# Patient Record
Sex: FEMALE | Race: WHITE | Employment: FULL TIME | URBAN - METROPOLITAN AREA
[De-identification: names, ages, dates, MRNs, and addresses within clinical notes are randomized per-mention and may not be internally consistent; named-entity substitution may affect disease eponyms.]

---

## 2017-01-27 ENCOUNTER — HOSPITAL ENCOUNTER (OUTPATIENT)
Dept: MAMMOGRAPHY | Facility: CLINIC | Age: 57
Discharge: HOME | End: 2017-01-27
Attending: OBSTETRICS & GYNECOLOGY | Admitting: OBSTETRICS & GYNECOLOGY
Payer: COMMERCIAL

## 2017-01-27 DIAGNOSIS — Z12.31 VISIT FOR SCREENING MAMMOGRAM: ICD-10-CM

## 2017-01-27 PROCEDURE — 77063 BREAST TOMOSYNTHESIS BI: CPT

## 2017-06-24 ENCOUNTER — HEALTH MAINTENANCE LETTER (OUTPATIENT)
Age: 57
End: 2017-06-24

## 2018-03-31 ENCOUNTER — HEALTH MAINTENANCE LETTER (OUTPATIENT)
Age: 58
End: 2018-03-31

## 2018-07-05 ENCOUNTER — HOSPITAL ENCOUNTER (OUTPATIENT)
Dept: MAMMOGRAPHY | Facility: CLINIC | Age: 58
Discharge: HOME | End: 2018-07-05
Attending: OBSTETRICS & GYNECOLOGY | Admitting: OBSTETRICS & GYNECOLOGY
Payer: COMMERCIAL

## 2018-07-05 DIAGNOSIS — Z12.31 VISIT FOR SCREENING MAMMOGRAM: ICD-10-CM

## 2018-07-05 PROCEDURE — 77067 SCR MAMMO BI INCL CAD: CPT

## 2019-10-02 ENCOUNTER — HEALTH MAINTENANCE LETTER (OUTPATIENT)
Age: 59
End: 2019-10-02

## 2020-01-23 ENCOUNTER — HOSPITAL ENCOUNTER (OUTPATIENT)
Dept: MAMMOGRAPHY | Facility: CLINIC | Age: 60
Discharge: HOME | End: 2020-01-23
Attending: OBSTETRICS & GYNECOLOGY | Admitting: OBSTETRICS & GYNECOLOGY
Payer: COMMERCIAL

## 2020-01-23 DIAGNOSIS — Z12.31 SCREENING MAMMOGRAM, ENCOUNTER FOR: ICD-10-CM

## 2020-01-23 PROCEDURE — 77063 BREAST TOMOSYNTHESIS BI: CPT

## 2020-01-29 ENCOUNTER — HOSPITAL ENCOUNTER (OUTPATIENT)
Dept: MAMMOGRAPHY | Facility: CLINIC | Age: 60
End: 2020-01-29
Attending: OBSTETRICS & GYNECOLOGY
Payer: COMMERCIAL

## 2020-01-29 ENCOUNTER — HOSPITAL ENCOUNTER (OUTPATIENT)
Dept: MAMMOGRAPHY | Facility: CLINIC | Age: 60
Discharge: HOME | End: 2020-01-29
Attending: OBSTETRICS & GYNECOLOGY | Admitting: OBSTETRICS & GYNECOLOGY
Payer: COMMERCIAL

## 2020-01-29 DIAGNOSIS — R92.8 ABNORMAL MAMMOGRAM: ICD-10-CM

## 2020-01-29 PROCEDURE — 00000158 ZZHCL STATISTIC H-FISH PROCESS B/S: Performed by: OBSTETRICS & GYNECOLOGY

## 2020-01-29 PROCEDURE — 00000159 ZZHCL STATISTIC H-SEND OUTS PREP: Performed by: OBSTETRICS & GYNECOLOGY

## 2020-01-29 PROCEDURE — 88305 TISSUE EXAM BY PATHOLOGIST: CPT | Performed by: OBSTETRICS & GYNECOLOGY

## 2020-01-29 PROCEDURE — 88305 TISSUE EXAM BY PATHOLOGIST: CPT | Mod: 26 | Performed by: OBSTETRICS & GYNECOLOGY

## 2020-01-29 PROCEDURE — 25000125 ZZHC RX 250: Performed by: OBSTETRICS & GYNECOLOGY

## 2020-01-29 PROCEDURE — 88360 TUMOR IMMUNOHISTOCHEM/MANUAL: CPT | Performed by: OBSTETRICS & GYNECOLOGY

## 2020-01-29 PROCEDURE — 76642 ULTRASOUND BREAST LIMITED: CPT | Mod: LT

## 2020-01-29 PROCEDURE — 27210206 US BREAST BIOPSY CORE NEEDLE LEFT

## 2020-01-29 PROCEDURE — 88360 TUMOR IMMUNOHISTOCHEM/MANUAL: CPT | Mod: 26 | Performed by: OBSTETRICS & GYNECOLOGY

## 2020-01-29 PROCEDURE — 88377 M/PHMTRC ALYS ISHQUANT/SEMIQ: CPT | Performed by: PATHOLOGY

## 2020-01-29 RX ORDER — NORETHINDRONE ACETATE AND ETHINYL ESTRADIOL .5; 2.5 MG/1; UG/1
TABLET ORAL DAILY
COMMUNITY
End: 2020-02-05

## 2020-01-29 RX ADMIN — LIDOCAINE HYDROCHLORIDE 5 ML: 10 INJECTION, SOLUTION INFILTRATION; PERINEURAL at 10:20

## 2020-01-29 NOTE — DISCHARGE INSTRUCTIONS

## 2020-01-30 ENCOUNTER — TELEPHONE (OUTPATIENT)
Dept: SURGERY | Facility: CLINIC | Age: 60
End: 2020-01-30

## 2020-01-31 ENCOUNTER — TELEPHONE (OUTPATIENT)
Dept: SURGERY | Facility: CLINIC | Age: 60
End: 2020-01-31

## 2020-01-31 LAB — COPATH REPORT: NORMAL

## 2020-01-31 NOTE — TELEPHONE ENCOUNTER
Deb notified of the following:    RESULTS:     ESTROGEN RECEPTORS:     Positive (95 % of tumor cell nuclei stain positive)   Intensity of staining strong     PROGESTERONE RECEPTORS:     Positive (30 % of tumor cell nuclei stain positive)   Intensity of staining strong     Await HER2 results. Keep surgical consultation as scheduled. Deb verbalized understanding.    Tawanna Hutchinson BSN, RN, OCN  Oncology Care Coordinator  [facility]  [facility]  Phone: [phone]

## 2020-02-03 LAB — COPATH REPORT: NORMAL

## 2020-02-04 ENCOUNTER — TELEPHONE (OUTPATIENT)
Dept: SURGERY | Facility: CLINIC | Age: 60
End: 2020-02-04

## 2020-02-04 NOTE — TELEPHONE ENCOUNTER
Call placed to patient with results of HER2:    INTERPRETATION:  Group 5 (JOCELYNN Negative)     Keep surgical consultation as scheduled. Both parties in agreement of plan.    Tawanna DAVISN, RN, OCN  Oncology Care Coordinator  [facility]  [facility]  Phone: [phone]

## 2020-02-05 ENCOUNTER — OFFICE VISIT (OUTPATIENT)
Dept: SURGERY | Facility: CLINIC | Age: 60
End: 2020-02-05
Payer: COMMERCIAL

## 2020-02-05 VITALS
HEIGHT: 69 IN | HEART RATE: 70 BPM | DIASTOLIC BLOOD PRESSURE: 88 MMHG | WEIGHT: 148 LBS | SYSTOLIC BLOOD PRESSURE: 136 MMHG | BODY MASS INDEX: 21.92 KG/M2

## 2020-02-05 DIAGNOSIS — C50.912 INVASIVE DUCTAL CARCINOMA OF BREAST, FEMALE, LEFT (H): ICD-10-CM

## 2020-02-05 PROCEDURE — 99205 OFFICE O/P NEW HI 60 MIN: CPT | Performed by: SURGERY

## 2020-02-05 ASSESSMENT — MIFFLIN-ST. JEOR: SCORE: 1320.7

## 2020-02-05 NOTE — NURSING NOTE
Breast Patients    BREAST PATIENTS (ALL)    1-Do you have any of the following symptoms? Breast Pain and Lump(s) or Mass(es)  2-In which breast are you having the symptoms? left  3-Have you had a Mammogram? Yes  Where: [facility]  Date: 1/23/20   4-Have you ever had a breast cyst drained? No  5-Have you ever had a breast biopsy? No  6-Have you ever had a Breast Cancer? No   7-Is there a history of Breast Cancer in your family? No  8-Have you ever had Ovarian Cancer? No  9-Is there a history of Ovarian Cancer in your family? No  10-Summarize your caffeine intake (i.e. coffee, tea, chocolate, soda etc.): coffee in the morning    BREAST PATIENTS (FEMALE)    11-What age did your periods begin? 15  12-Date your last menstrual period began? 4 years ago  13-Number of full-term pregnancies: 1  14-Your age when your first child was born? 38  15-Did you nurse your children? No  16-Are you pregnant now? No  17-Have you begun menopause? Yes  Age Menopause began:  [age]  18-Have you had either ovary removed?No  19-Do you have breast implants? No   20-Do you use hormone replacement therapy?  Yes  Type: fyavolv  21-Have you taken oral contraceptive pills?  Yes, For how many years?  30  22-Have you had an intrauterine device (IUD) placed?  No  23-What is your current bra size?  34B    Susan Obregon CMA  2/5/2020      9:55 AM'

## 2020-02-05 NOTE — LETTER
February 6, 2020          Fab L Middle Bass, MD  [facility]  [address] [facility] [address]  [place] [address]      RE:   Deb Shaw 1960      Dear Colleague,    Thank you for referring your patient, Deb Shaw, to [facility]. Please see a copy of my visit note below.    Healthy 59-year-old female with a new diagnosis of invasive ductal carcinoma on the left.  Although this is not a particularly large lesion at 2.5 cm, given the patient's very small breast size, I feel a lumpectomy would leave a poor cosmetic result.  I did explain the equivalent survival rates between lumpectomy with radiation and mastectomy.  We talked about additional options including chemotherapy and anti-hormone therapy.  She is a good candidate for antiestrogen therapy, the decision regarding chemo will likely be made after surgery.  I think she should be a candidate for nipple sparing mastectomy on the left.  She had questions about reconstruction and size, and I did say that something might need to be done on the right for symmetry.  We are having her meet with plastic surgery to discuss reconstruction options.  Mastectomy without reconstruction was also a consideration, but she did not have much interest in this.  After she meets with plastic surgery we will discuss her final thoughts and likely get her scheduled.  I have advised her to discontinue her hormone replacement, although she did have concerns about this.  Given her relatively young age, she may consider genetic counseling, but this could be done at a later time.  We will notify her once her plastic surgery consult is complete.  Surgical co-morbities include asthma, bursitis, herniated disc.    Again, thank you for allowing me to participate in the care of your patient.      Sincerely,      Bob Gonzalez MD

## 2020-02-06 PROBLEM — C50.912 INVASIVE DUCTAL CARCINOMA OF BREAST, FEMALE, LEFT (H): Status: ACTIVE | Noted: 2020-02-06

## 2020-02-06 NOTE — PROGRESS NOTES
Surgery Consultation, [facility] HOLA Gonzalez MD, MD    Deb Shaw MRN# [id number]   YOB: 1960 Age: 59 year old     PCP:  Fab Owens [phone]    Chief Complaint: Newly diagnosed left breast cancer    Pt was seen in consultation from Fab Owens.    History of Present Illness:  Deb Shaw is a 59 year old female who presented with a palpable mass in the left central breast.  This was intermittently tender.  Not associated with any nipple discharge or skin changes.  Patient is thin and has very small breasts bilaterally.  No history of previous breast biopsy but has been recalled from screening mammogram.  No history of previous breast infection.  No family history of breast cancer.  She has 1 child which she did not breast-feed.  She is postmenopausal.  The finding in the left breast was further assessed with diagnostic mammogram and ultrasound and biopsy was performed.  This revealed invasive ductal carcinoma.  This was strongly ER positive, 30% CO positive.  HER-2/bulmaro was negative.  Patient has a long history of taking oral birth control when she was younger.  Has been taking hormone supplementation for the past 2 years due to postmenopausal symptoms.  Patient is here to discuss her diagnosis.    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  PSH:  Deb Shaw  has a past surgical history that includes REPAIR OF HAMMERTOE,ONE (12/03) and TOOTH EXTRACTION W/FORCEP (1982).    Home medications and allergies reviewed.    Social History:  Deb Shaw  reports that she has never smoked. She has never used smokeless tobacco. She reports current alcohol use. She reports that she does not use  "drugs.  Family History:  Deb Shaw family history includes Allergies in her brother; C.A.D. in her paternal grandfather; Cancer in her father; Coronary Artery Disease in her father; Hypertension in her father; Thyroid Disease in her mother.    ROS:  The 10 point Review of Systems is negative other than noted in the HPI.  No fevers or chills.  No recent weight loss or weight gain.  No headaches or back pain.    Physical Exam:  Blood pressure 136/88, pulse 70, height 1.753 m (5' 9\"), weight 67.1 kg (148 lb), not currently breastfeeding.  148 lbs 0 oz  Thin healthy appearing female in no distress.  Patient has a pleasant affect and communicates well.   Pupils equal round and reactive to light.   No cervical lymphadenopathy or thyromegaly.   Lung fields clear, breathing comfortably.   Heart normal sinus rhythm.  No murmurs rubs or gallops.  Bilateral breast exam performed.  Very small breasts bilaterally.  Dense, somewhat lumpy bumpy.  No axillary lymphadenopathy on either side.  Easily palpable 2 to 3 cm mass in the left central breast behind the areola.  No skin thickening or nipple inversion.  Skin warm, dry.  No obvious rashes or lesions.    All new lab and imaging data was reviewed.  This includes personal review of mammogram and ultrasound images, pathology reports, clinic notes.     Assessment/plan: Healthy 59-year-old female with a new diagnosis of invasive ductal carcinoma on the left.  Although this is not a particularly large lesion at 2.5 cm, given the patient's very small breast size, I feel a lumpectomy would leave a poor cosmetic result.  I did explain the equivalent survival rates between lumpectomy with radiation and mastectomy.  We talked about additional options including chemotherapy and anti-hormone therapy.  She is a good candidate for antiestrogen therapy, the decision regarding chemo will likely be made after surgery.  I think she should be a candidate for nipple sparing mastectomy on the " left.  She had questions about reconstruction and size, and I did say that something might need to be done on the right for symmetry.  We are having her meet with plastic surgery to discuss reconstruction options.  Mastectomy without reconstruction was also a consideration, but she did not have much interest in this.  After she meets with plastic surgery we will discuss her final thoughts and likely get her scheduled.  I have advised her to discontinue her hormone replacement, although she did have concerns about this.  Given her relatively young age, she may consider genetic counseling, but this could be done at a later time.  We will notify her once her plastic surgery consult is complete.  Surgical co-morbities include asthma, bursitis, herniated disc.    Maximo Gonzalez M.D.  [facility]  [phone]    Total time spent 60 minutes, 50 minutes of which was spent discussing diagnosis, treatment options, and prognosis.    Please route or send letter to:  Primary Care Provider (PCP) and Referring Provider

## 2020-02-11 ENCOUNTER — TRANSFERRED RECORDS (OUTPATIENT)
Dept: HEALTH INFORMATION MANAGEMENT | Facility: CLINIC | Age: 60
End: 2020-02-11

## 2020-02-12 ENCOUNTER — MEDICAL CORRESPONDENCE (OUTPATIENT)
Dept: HEALTH INFORMATION MANAGEMENT | Facility: CLINIC | Age: 60
End: 2020-02-12

## 2020-02-12 ENCOUNTER — PREP FOR PROCEDURE (OUTPATIENT)
Dept: SURGERY | Facility: CLINIC | Age: 60
End: 2020-02-12

## 2020-02-12 ENCOUNTER — CARE COORDINATION (OUTPATIENT)
Dept: SURGERY | Facility: CLINIC | Age: 60
End: 2020-02-12

## 2020-02-12 ENCOUNTER — TRANSFERRED RECORDS (OUTPATIENT)
Dept: HEALTH INFORMATION MANAGEMENT | Facility: CLINIC | Age: 60
End: 2020-02-12

## 2020-02-12 DIAGNOSIS — C50.912 MALIGNANT NEOPLASM OF LEFT BREAST (H): Primary | ICD-10-CM

## 2020-02-12 NOTE — PROGRESS NOTES
Follow up call placed to Deb. Deb met with Dr. Lopez, plastic surgeon. She is interested in proceeding with bilateral nipple sparing mastectomy with reconstruction. Await call from surgery scheduler.    Rx faxed to [facility] for post mastectomy garment.     Taawnna DAVISN, RN, OCN  Oncology Care Coordinator  [facility]  [facility]  Phone: [phone]

## 2020-02-13 ENCOUNTER — TELEPHONE (OUTPATIENT)
Dept: SURGERY | Facility: CLINIC | Age: 60
End: 2020-02-13

## 2020-02-13 DIAGNOSIS — C50.912 MALIGNANT NEOPLASM OF LEFT BREAST (H): Primary | ICD-10-CM

## 2020-02-13 NOTE — TELEPHONE ENCOUNTER
Type of surgery: Bilateral nipple sparing mastectomy with left sentinel lymph sanjay biopsy, possible left axillary node dissection  Location of surgery: [facility]  Date and time of surgery: 3/5/20 at 10:30am  Surgeon: Dr. Bob Gonzalez  Pre-Op Appt Date: Patient to schedule  Post-Op Appt Date: Patient to schedule   Packet sent out: Yes  Pre-cert/Authorization completed:  Not Applicable  Date: 2/13/20

## 2020-02-28 ENCOUNTER — TELEPHONE (OUTPATIENT)
Dept: SURGERY | Facility: CLINIC | Age: 60
End: 2020-02-28

## 2020-02-28 NOTE — TELEPHONE ENCOUNTER
Deb is scheduled for bilateral nipple sparing mastectomy, left SLNB on 3/5/2020 with Dr. Gonzalez. Reconstruction per Dr. Lopez. Pre procedure call placed to patient. All patient's questions answered appropriately and thoroughly. Deb knows to contact us in the interim with questions.     Tawanna DAVISN, RN, OCN  Oncology Care Coordinator  [facility]  [facility]  Phone: [phone]

## 2020-03-04 RX ORDER — CETIRIZINE HYDROCHLORIDE 10 MG/1
10 TABLET ORAL DAILY
COMMUNITY

## 2020-03-04 RX ORDER — ASCORBIC ACID 500 MG
500 TABLET ORAL EVERY MORNING
COMMUNITY

## 2020-03-04 RX ORDER — NORETHINDRONE ACETATE AND ETHINYL ESTRADIOL 1; 5 MG/1; UG/1
1 TABLET ORAL DAILY
Status: ON HOLD | COMMUNITY
End: 2020-03-05

## 2020-03-04 RX ORDER — TOBRAMYCIN 3 MG/ML
1-2 SOLUTION/ DROPS OPHTHALMIC
Status: ON HOLD | COMMUNITY
End: 2020-03-05

## 2020-03-05 ENCOUNTER — APPOINTMENT (OUTPATIENT)
Dept: SURGERY | Facility: PHYSICIAN GROUP | Age: 60
End: 2020-03-05
Payer: COMMERCIAL

## 2020-03-05 ENCOUNTER — TRANSFERRED RECORDS (OUTPATIENT)
Dept: HEALTH INFORMATION MANAGEMENT | Facility: CLINIC | Age: 60
End: 2020-03-05

## 2020-03-05 ENCOUNTER — HOSPITAL ENCOUNTER (OUTPATIENT)
Facility: CLINIC | Age: 60
Discharge: HOME | End: 2020-03-06
Attending: SURGERY | Admitting: SURGERY
Payer: COMMERCIAL

## 2020-03-05 ENCOUNTER — SURGERY (OUTPATIENT)
Age: 60
End: 2020-03-05
Payer: COMMERCIAL

## 2020-03-05 ENCOUNTER — ANESTHESIA EVENT (OUTPATIENT)
Dept: SURGERY | Facility: CLINIC | Age: 60
End: 2020-03-05
Payer: COMMERCIAL

## 2020-03-05 ENCOUNTER — ANESTHESIA (OUTPATIENT)
Dept: SURGERY | Facility: CLINIC | Age: 60
End: 2020-03-05
Payer: COMMERCIAL

## 2020-03-05 ENCOUNTER — HOSPITAL ENCOUNTER (OUTPATIENT)
Dept: NUCLEAR MEDICINE | Facility: CLINIC | Age: 60
Setting detail: NUCLEAR MEDICINE
Discharge: HOME | End: 2020-03-05
Attending: SURGERY | Admitting: SURGERY
Payer: COMMERCIAL

## 2020-03-05 DIAGNOSIS — G89.18 ACUTE POST-OPERATIVE PAIN: ICD-10-CM

## 2020-03-05 DIAGNOSIS — C50.912 MALIGNANT NEOPLASM OF LEFT BREAST (H): ICD-10-CM

## 2020-03-05 DIAGNOSIS — C50.912 INVASIVE DUCTAL CARCINOMA OF BREAST, FEMALE, LEFT (H): Primary | ICD-10-CM

## 2020-03-05 PROBLEM — C50.919 BREAST CANCER (H): Status: ACTIVE | Noted: 2020-03-05

## 2020-03-05 LAB
CREAT SERPL-MCNC: 0.8 MG/DL (ref 0.52–1.04)
GFR SERPL CREATININE-BSD FRML MDRD: 82 ML/MIN/{1.73_M2}

## 2020-03-05 PROCEDURE — 40000170 ZZH STATISTIC PRE-PROCEDURE ASSESSMENT II: Performed by: SURGERY

## 2020-03-05 PROCEDURE — 25000125 ZZHC RX 250: Performed by: SURGERY

## 2020-03-05 PROCEDURE — 25000128 H RX IP 250 OP 636: Performed by: PLASTIC SURGERY

## 2020-03-05 PROCEDURE — 82565 ASSAY OF CREATININE: CPT | Performed by: SURGERY

## 2020-03-05 PROCEDURE — 25800030 ZZH RX IP 258 OP 636: Performed by: ANESTHESIOLOGY

## 2020-03-05 PROCEDURE — A9520 TC99 TILMANOCEPT DIAG 0.5MCI: HCPCS | Performed by: SURGERY

## 2020-03-05 PROCEDURE — 37000009 ZZH ANESTHESIA TECHNICAL FEE, EACH ADDTL 15 MIN: Performed by: SURGERY

## 2020-03-05 PROCEDURE — 36000056 ZZH SURGERY LEVEL 3 1ST 30 MIN: Performed by: SURGERY

## 2020-03-05 PROCEDURE — 34300033 ZZH RX 343: Performed by: SURGERY

## 2020-03-05 PROCEDURE — 25000125 ZZHC RX 250: Performed by: ANESTHESIOLOGY

## 2020-03-05 PROCEDURE — 88305 TISSUE EXAM BY PATHOLOGIST: CPT | Performed by: SURGERY

## 2020-03-05 PROCEDURE — 71000013 ZZH RECOVERY PHASE 1 LEVEL 1 EA ADDTL HR: Performed by: SURGERY

## 2020-03-05 PROCEDURE — 19303 MAST SIMPLE COMPLETE: CPT | Mod: 50 | Performed by: PHYSICIAN ASSISTANT

## 2020-03-05 PROCEDURE — 88305 TISSUE EXAM BY PATHOLOGIST: CPT | Mod: 26 | Performed by: SURGERY

## 2020-03-05 PROCEDURE — 38525 BIOPSY/REMOVAL LYMPH NODES: CPT | Mod: LT | Performed by: SURGERY

## 2020-03-05 PROCEDURE — 25800025 ZZH RX 258: Performed by: PLASTIC SURGERY

## 2020-03-05 PROCEDURE — 25000566 ZZH SEVOFLURANE, EA 15 MIN: Performed by: SURGERY

## 2020-03-05 PROCEDURE — 88331 PATH CONSLTJ SURG 1 BLK 1SPC: CPT | Mod: XS | Performed by: SURGERY

## 2020-03-05 PROCEDURE — 25800030 ZZH RX IP 258 OP 636: Performed by: PLASTIC SURGERY

## 2020-03-05 PROCEDURE — 25000128 H RX IP 250 OP 636: Performed by: NURSE ANESTHETIST, CERTIFIED REGISTERED

## 2020-03-05 PROCEDURE — 19303 MAST SIMPLE COMPLETE: CPT | Mod: 50 | Performed by: SURGERY

## 2020-03-05 PROCEDURE — 25000128 H RX IP 250 OP 636: Performed by: SURGERY

## 2020-03-05 PROCEDURE — 88307 TISSUE EXAM BY PATHOLOGIST: CPT | Performed by: SURGERY

## 2020-03-05 PROCEDURE — 36000058 ZZH SURGERY LEVEL 3 EA 15 ADDTL MIN: Performed by: SURGERY

## 2020-03-05 PROCEDURE — 25000132 ZZH RX MED GY IP 250 OP 250 PS 637: Performed by: PLASTIC SURGERY

## 2020-03-05 PROCEDURE — 88307 TISSUE EXAM BY PATHOLOGIST: CPT | Mod: 26 | Performed by: SURGERY

## 2020-03-05 PROCEDURE — 36415 COLL VENOUS BLD VENIPUNCTURE: CPT | Performed by: SURGERY

## 2020-03-05 PROCEDURE — 37000008 ZZH ANESTHESIA TECHNICAL FEE, 1ST 30 MIN: Performed by: SURGERY

## 2020-03-05 PROCEDURE — 25000125 ZZHC RX 250: Performed by: NURSE ANESTHETIST, CERTIFIED REGISTERED

## 2020-03-05 PROCEDURE — 27810323 ZZHC OR TISSUE EXPANDER OPNP: Performed by: SURGERY

## 2020-03-05 PROCEDURE — 25000128 H RX IP 250 OP 636: Performed by: ANESTHESIOLOGY

## 2020-03-05 PROCEDURE — 71000012 ZZH RECOVERY PHASE 1 LEVEL 1 FIRST HR: Performed by: SURGERY

## 2020-03-05 PROCEDURE — 25800030 ZZH RX IP 258 OP 636: Performed by: NURSE ANESTHETIST, CERTIFIED REGISTERED

## 2020-03-05 PROCEDURE — 88331 PATH CONSLTJ SURG 1 BLK 1SPC: CPT | Mod: 26,59 | Performed by: SURGERY

## 2020-03-05 PROCEDURE — 38792 RA TRACER ID OF SENTINL NODE: CPT

## 2020-03-05 PROCEDURE — 27210794 ZZH OR GENERAL SUPPLY STERILE: Performed by: SURGERY

## 2020-03-05 DEVICE — IMPLANTABLE DEVICE: Type: IMPLANTABLE DEVICE | Site: BREAST | Status: FUNCTIONAL

## 2020-03-05 RX ORDER — GABAPENTIN 300 MG/1
300 CAPSULE ORAL 2 TIMES DAILY
Status: DISCONTINUED | OUTPATIENT
Start: 2020-03-05 | End: 2020-03-06 | Stop reason: HOSPADM

## 2020-03-05 RX ORDER — SCOLOPAMINE TRANSDERMAL SYSTEM 1 MG/1
1 PATCH, EXTENDED RELEASE TRANSDERMAL ONCE
Status: COMPLETED | OUTPATIENT
Start: 2020-03-05 | End: 2020-03-05

## 2020-03-05 RX ORDER — AMOXICILLIN 250 MG
2 CAPSULE ORAL 2 TIMES DAILY
Status: DISCONTINUED | OUTPATIENT
Start: 2020-03-05 | End: 2020-03-06 | Stop reason: HOSPADM

## 2020-03-05 RX ORDER — MAGNESIUM HYDROXIDE 1200 MG/15ML
LIQUID ORAL PRN
Status: DISCONTINUED | OUTPATIENT
Start: 2020-03-05 | End: 2020-03-05 | Stop reason: HOSPADM

## 2020-03-05 RX ORDER — CEFAZOLIN SODIUM 1 G/3ML
1 INJECTION, POWDER, FOR SOLUTION INTRAMUSCULAR; INTRAVENOUS SEE ADMIN INSTRUCTIONS
Status: DISCONTINUED | OUTPATIENT
Start: 2020-03-05 | End: 2020-03-05 | Stop reason: HOSPADM

## 2020-03-05 RX ORDER — GABAPENTIN 300 MG/1
300 CAPSULE ORAL
Status: COMPLETED | OUTPATIENT
Start: 2020-03-05 | End: 2020-03-05

## 2020-03-05 RX ORDER — BUPIVACAINE HYDROCHLORIDE AND EPINEPHRINE 2.5; 5 MG/ML; UG/ML
INJECTION, SOLUTION EPIDURAL; INFILTRATION; INTRACAUDAL; PERINEURAL
Status: DISCONTINUED
Start: 2020-03-05 | End: 2020-03-05 | Stop reason: HOSPADM

## 2020-03-05 RX ORDER — HYDROMORPHONE HYDROCHLORIDE 1 MG/ML
.3-.5 INJECTION, SOLUTION INTRAMUSCULAR; INTRAVENOUS; SUBCUTANEOUS EVERY 10 MIN PRN
Status: DISCONTINUED | OUTPATIENT
Start: 2020-03-05 | End: 2020-03-05 | Stop reason: HOSPADM

## 2020-03-05 RX ORDER — ONDANSETRON 4 MG/1
4 TABLET, ORALLY DISINTEGRATING ORAL EVERY 30 MIN PRN
Status: DISCONTINUED | OUTPATIENT
Start: 2020-03-05 | End: 2020-03-05 | Stop reason: HOSPADM

## 2020-03-05 RX ORDER — FENTANYL CITRATE 50 UG/ML
25-50 INJECTION, SOLUTION INTRAMUSCULAR; INTRAVENOUS
Status: DISCONTINUED | OUTPATIENT
Start: 2020-03-05 | End: 2020-03-05 | Stop reason: HOSPADM

## 2020-03-05 RX ORDER — ONDANSETRON 2 MG/ML
4 INJECTION INTRAMUSCULAR; INTRAVENOUS EVERY 30 MIN PRN
Status: DISCONTINUED | OUTPATIENT
Start: 2020-03-05 | End: 2020-03-05 | Stop reason: HOSPADM

## 2020-03-05 RX ORDER — SODIUM CHLORIDE, SODIUM LACTATE, POTASSIUM CHLORIDE, CALCIUM CHLORIDE 600; 310; 30; 20 MG/100ML; MG/100ML; MG/100ML; MG/100ML
INJECTION, SOLUTION INTRAVENOUS CONTINUOUS
Status: DISCONTINUED | OUTPATIENT
Start: 2020-03-05 | End: 2020-03-05 | Stop reason: HOSPADM

## 2020-03-05 RX ORDER — INDOCYANINE GREEN AND WATER 25 MG
KIT INJECTION PRN
Status: DISCONTINUED | OUTPATIENT
Start: 2020-03-05 | End: 2020-03-05

## 2020-03-05 RX ORDER — CEFAZOLIN SODIUM 1 G/3ML
1 INJECTION, POWDER, FOR SOLUTION INTRAMUSCULAR; INTRAVENOUS EVERY 8 HOURS
Status: COMPLETED | OUTPATIENT
Start: 2020-03-05 | End: 2020-03-06

## 2020-03-05 RX ORDER — CEPHALEXIN 500 MG/1
500 CAPSULE ORAL EVERY 6 HOURS SCHEDULED
Status: DISCONTINUED | OUTPATIENT
Start: 2020-03-06 | End: 2020-03-06 | Stop reason: HOSPADM

## 2020-03-05 RX ORDER — ALBUTEROL SULFATE 90 UG/1
2 AEROSOL, METERED RESPIRATORY (INHALATION) EVERY 6 HOURS PRN
Status: DISCONTINUED | OUTPATIENT
Start: 2020-03-05 | End: 2020-03-06 | Stop reason: HOSPADM

## 2020-03-05 RX ORDER — CEFAZOLIN SODIUM 2 G/100ML
2 INJECTION, SOLUTION INTRAVENOUS
Status: DISCONTINUED | OUTPATIENT
Start: 2020-03-05 | End: 2020-03-05 | Stop reason: HOSPADM

## 2020-03-05 RX ORDER — EPHEDRINE SULFATE 50 MG/ML
INJECTION, SOLUTION INTRAMUSCULAR; INTRAVENOUS; SUBCUTANEOUS PRN
Status: DISCONTINUED | OUTPATIENT
Start: 2020-03-05 | End: 2020-03-05

## 2020-03-05 RX ORDER — LIDOCAINE 40 MG/G
CREAM TOPICAL
Status: DISCONTINUED | OUTPATIENT
Start: 2020-03-05 | End: 2020-03-06 | Stop reason: HOSPADM

## 2020-03-05 RX ORDER — HYDROCODONE BITARTRATE AND ACETAMINOPHEN 5; 325 MG/1; MG/1
1-2 TABLET ORAL EVERY 4 HOURS PRN
Status: DISCONTINUED | OUTPATIENT
Start: 2020-03-05 | End: 2020-03-06 | Stop reason: HOSPADM

## 2020-03-05 RX ORDER — FLUTICASONE PROPIONATE 50 MCG
1 SPRAY, SUSPENSION (ML) NASAL DAILY
COMMUNITY

## 2020-03-05 RX ORDER — LIDOCAINE 40 MG/G
CREAM TOPICAL
Status: DISCONTINUED | OUTPATIENT
Start: 2020-03-05 | End: 2020-03-05 | Stop reason: HOSPADM

## 2020-03-05 RX ORDER — NALOXONE HYDROCHLORIDE 0.4 MG/ML
.1-.4 INJECTION, SOLUTION INTRAMUSCULAR; INTRAVENOUS; SUBCUTANEOUS
Status: DISCONTINUED | OUTPATIENT
Start: 2020-03-05 | End: 2020-03-05 | Stop reason: HOSPADM

## 2020-03-05 RX ORDER — FLUTICASONE PROPIONATE 50 MCG
1 SPRAY, SUSPENSION (ML) NASAL DAILY
Status: DISCONTINUED | OUTPATIENT
Start: 2020-03-06 | End: 2020-03-06 | Stop reason: HOSPADM

## 2020-03-05 RX ORDER — MEPERIDINE HYDROCHLORIDE 25 MG/ML
12.5 INJECTION INTRAMUSCULAR; INTRAVENOUS; SUBCUTANEOUS
Status: DISCONTINUED | OUTPATIENT
Start: 2020-03-05 | End: 2020-03-05 | Stop reason: HOSPADM

## 2020-03-05 RX ORDER — ACETAMINOPHEN 325 MG/1
650 TABLET ORAL DAILY PRN
Status: ON HOLD | COMMUNITY
End: 2020-03-05

## 2020-03-05 RX ORDER — SODIUM CHLORIDE, SODIUM LACTATE, POTASSIUM CHLORIDE, CALCIUM CHLORIDE 600; 310; 30; 20 MG/100ML; MG/100ML; MG/100ML; MG/100ML
INJECTION, SOLUTION INTRAVENOUS CONTINUOUS PRN
Status: DISCONTINUED | OUTPATIENT
Start: 2020-03-05 | End: 2020-03-05

## 2020-03-05 RX ORDER — DEXAMETHASONE SODIUM PHOSPHATE 4 MG/ML
INJECTION, SOLUTION INTRA-ARTICULAR; INTRALESIONAL; INTRAMUSCULAR; INTRAVENOUS; SOFT TISSUE PRN
Status: DISCONTINUED | OUTPATIENT
Start: 2020-03-05 | End: 2020-03-05

## 2020-03-05 RX ORDER — FENTANYL CITRATE 50 UG/ML
INJECTION, SOLUTION INTRAMUSCULAR; INTRAVENOUS PRN
Status: DISCONTINUED | OUTPATIENT
Start: 2020-03-05 | End: 2020-03-05

## 2020-03-05 RX ORDER — AMOXICILLIN 500 MG/1
2000 CAPSULE ORAL
COMMUNITY

## 2020-03-05 RX ORDER — PROPOFOL 10 MG/ML
INJECTION, EMULSION INTRAVENOUS PRN
Status: DISCONTINUED | OUTPATIENT
Start: 2020-03-05 | End: 2020-03-05

## 2020-03-05 RX ORDER — METHOCARBAMOL 750 MG/1
750 TABLET, FILM COATED ORAL EVERY 6 HOURS PRN
Status: DISCONTINUED | OUTPATIENT
Start: 2020-03-05 | End: 2020-03-06 | Stop reason: HOSPADM

## 2020-03-05 RX ORDER — OXYCODONE HCL 10 MG/1
10 TABLET, FILM COATED, EXTENDED RELEASE ORAL ONCE
Status: COMPLETED | OUTPATIENT
Start: 2020-03-05 | End: 2020-03-05

## 2020-03-05 RX ORDER — NALOXONE HYDROCHLORIDE 0.4 MG/ML
.1-.4 INJECTION, SOLUTION INTRAMUSCULAR; INTRAVENOUS; SUBCUTANEOUS
Status: DISCONTINUED | OUTPATIENT
Start: 2020-03-05 | End: 2020-03-06 | Stop reason: HOSPADM

## 2020-03-05 RX ORDER — AMOXICILLIN 250 MG
1 CAPSULE ORAL 2 TIMES DAILY
Status: DISCONTINUED | OUTPATIENT
Start: 2020-03-05 | End: 2020-03-06 | Stop reason: HOSPADM

## 2020-03-05 RX ORDER — ONDANSETRON 2 MG/ML
4 INJECTION INTRAMUSCULAR; INTRAVENOUS EVERY 6 HOURS PRN
Status: DISCONTINUED | OUTPATIENT
Start: 2020-03-05 | End: 2020-03-06 | Stop reason: HOSPADM

## 2020-03-05 RX ORDER — METOCLOPRAMIDE 10 MG/1
10 TABLET ORAL EVERY 6 HOURS PRN
Status: DISCONTINUED | OUTPATIENT
Start: 2020-03-05 | End: 2020-03-06 | Stop reason: HOSPADM

## 2020-03-05 RX ORDER — ONDANSETRON 4 MG/1
4 TABLET, ORALLY DISINTEGRATING ORAL EVERY 6 HOURS PRN
Status: DISCONTINUED | OUTPATIENT
Start: 2020-03-05 | End: 2020-03-06 | Stop reason: HOSPADM

## 2020-03-05 RX ORDER — ONDANSETRON 2 MG/ML
INJECTION INTRAMUSCULAR; INTRAVENOUS PRN
Status: DISCONTINUED | OUTPATIENT
Start: 2020-03-05 | End: 2020-03-05

## 2020-03-05 RX ORDER — HYDROXYZINE HYDROCHLORIDE 25 MG/1
25 TABLET, FILM COATED ORAL EVERY 6 HOURS PRN
Status: DISCONTINUED | OUTPATIENT
Start: 2020-03-05 | End: 2020-03-06 | Stop reason: HOSPADM

## 2020-03-05 RX ORDER — CEFAZOLIN SODIUM 2 G/100ML
2 INJECTION, SOLUTION INTRAVENOUS
Status: COMPLETED | OUTPATIENT
Start: 2020-03-05 | End: 2020-03-05

## 2020-03-05 RX ORDER — KETAMINE HYDROCHLORIDE 10 MG/ML
INJECTION, SOLUTION INTRAMUSCULAR; INTRAVENOUS PRN
Status: DISCONTINUED | OUTPATIENT
Start: 2020-03-05 | End: 2020-03-05

## 2020-03-05 RX ORDER — DEXTROSE MONOHYDRATE, SODIUM CHLORIDE, AND POTASSIUM CHLORIDE 50; 1.49; 4.5 G/1000ML; G/1000ML; G/1000ML
INJECTION, SOLUTION INTRAVENOUS CONTINUOUS
Status: DISCONTINUED | OUTPATIENT
Start: 2020-03-05 | End: 2020-03-06 | Stop reason: HOSPADM

## 2020-03-05 RX ORDER — CETIRIZINE HYDROCHLORIDE 10 MG/1
10 TABLET ORAL DAILY
Status: DISCONTINUED | OUTPATIENT
Start: 2020-03-06 | End: 2020-03-06 | Stop reason: HOSPADM

## 2020-03-05 RX ORDER — PROCHLORPERAZINE MALEATE 5 MG
10 TABLET ORAL EVERY 6 HOURS PRN
Status: DISCONTINUED | OUTPATIENT
Start: 2020-03-05 | End: 2020-03-06 | Stop reason: HOSPADM

## 2020-03-05 RX ORDER — PROPOFOL 10 MG/ML
INJECTION, EMULSION INTRAVENOUS CONTINUOUS PRN
Status: DISCONTINUED | OUTPATIENT
Start: 2020-03-05 | End: 2020-03-05

## 2020-03-05 RX ORDER — METOCLOPRAMIDE HYDROCHLORIDE 5 MG/ML
10 INJECTION INTRAMUSCULAR; INTRAVENOUS EVERY 6 HOURS PRN
Status: DISCONTINUED | OUTPATIENT
Start: 2020-03-05 | End: 2020-03-06 | Stop reason: HOSPADM

## 2020-03-05 RX ORDER — LIDOCAINE HYDROCHLORIDE 20 MG/ML
INJECTION, SOLUTION INFILTRATION; PERINEURAL PRN
Status: DISCONTINUED | OUTPATIENT
Start: 2020-03-05 | End: 2020-03-05

## 2020-03-05 RX ORDER — HYDROMORPHONE HYDROCHLORIDE 1 MG/ML
.3-.5 INJECTION, SOLUTION INTRAMUSCULAR; INTRAVENOUS; SUBCUTANEOUS
Status: DISCONTINUED | OUTPATIENT
Start: 2020-03-05 | End: 2020-03-06 | Stop reason: HOSPADM

## 2020-03-05 RX ADMIN — CEFAZOLIN SODIUM 2 G: 2 INJECTION, SOLUTION INTRAVENOUS at 10:40

## 2020-03-05 RX ADMIN — FENTANYL CITRATE 50 MCG: 50 INJECTION, SOLUTION INTRAMUSCULAR; INTRAVENOUS at 10:54

## 2020-03-05 RX ADMIN — SENNOSIDES AND DOCUSATE SODIUM 2 TABLET: 8.6; 5 TABLET ORAL at 20:29

## 2020-03-05 RX ADMIN — GABAPENTIN 300 MG: 300 CAPSULE ORAL at 20:29

## 2020-03-05 RX ADMIN — SODIUM CHLORIDE 1000 ML: 900 IRRIGANT IRRIGATION at 13:02

## 2020-03-05 RX ADMIN — FENTANYL CITRATE 50 MCG: 50 INJECTION, SOLUTION INTRAMUSCULAR; INTRAVENOUS at 13:29

## 2020-03-05 RX ADMIN — ONDANSETRON 4 MG: 2 INJECTION INTRAMUSCULAR; INTRAVENOUS at 10:34

## 2020-03-05 RX ADMIN — INDOCYANINE GREEN 7.5 MG: KIT INTRAVENOUS at 14:32

## 2020-03-05 RX ADMIN — PROPOFOL 200 MG: 10 INJECTION, EMULSION INTRAVENOUS at 10:32

## 2020-03-05 RX ADMIN — DEXAMETHASONE SODIUM PHOSPHATE 4 MG: 4 INJECTION, SOLUTION INTRA-ARTICULAR; INTRALESIONAL; INTRAMUSCULAR; INTRAVENOUS; SOFT TISSUE at 10:34

## 2020-03-05 RX ADMIN — SODIUM CHLORIDE, POTASSIUM CHLORIDE, SODIUM LACTATE AND CALCIUM CHLORIDE: 600; 310; 30; 20 INJECTION, SOLUTION INTRAVENOUS at 16:41

## 2020-03-05 RX ADMIN — PROPOFOL 100 MCG/KG/MIN: 10 INJECTION, EMULSION INTRAVENOUS at 10:32

## 2020-03-05 RX ADMIN — Medication 10 MG: at 11:34

## 2020-03-05 RX ADMIN — FENTANYL CITRATE 50 MCG: 50 INJECTION, SOLUTION INTRAMUSCULAR; INTRAVENOUS at 12:24

## 2020-03-05 RX ADMIN — KETAMINE HYDROCHLORIDE 30 MG: 10 INJECTION, SOLUTION INTRAMUSCULAR; INTRAVENOUS at 12:51

## 2020-03-05 RX ADMIN — POTASSIUM CHLORIDE, DEXTROSE MONOHYDRATE AND SODIUM CHLORIDE: 150; 5; 450 INJECTION, SOLUTION INTRAVENOUS at 20:37

## 2020-03-05 RX ADMIN — OXYCODONE HYDROCHLORIDE 10 MG: 10 TABLET, FILM COATED, EXTENDED RELEASE ORAL at 08:33

## 2020-03-05 RX ADMIN — DEXMEDETOMIDINE HYDROCHLORIDE 8 MCG: 100 INJECTION, SOLUTION INTRAVENOUS at 11:12

## 2020-03-05 RX ADMIN — CEFAZOLIN 1 G: 1 INJECTION, POWDER, FOR SOLUTION INTRAMUSCULAR; INTRAVENOUS at 20:28

## 2020-03-05 RX ADMIN — DEXMEDETOMIDINE HYDROCHLORIDE 12 MCG: 100 INJECTION, SOLUTION INTRAVENOUS at 11:01

## 2020-03-05 RX ADMIN — HYDROMORPHONE HYDROCHLORIDE 0.5 MG: 1 INJECTION, SOLUTION INTRAMUSCULAR; INTRAVENOUS; SUBCUTANEOUS at 16:36

## 2020-03-05 RX ADMIN — TILMANOCEPT 0.5 MILLICURIE: KIT at 09:36

## 2020-03-05 RX ADMIN — SODIUM CHLORIDE, POTASSIUM CHLORIDE, SODIUM LACTATE AND CALCIUM CHLORIDE: 600; 310; 30; 20 INJECTION, SOLUTION INTRAVENOUS at 10:28

## 2020-03-05 RX ADMIN — CEFAZOLIN SODIUM 1 G: 2 INJECTION, SOLUTION INTRAVENOUS at 12:40

## 2020-03-05 RX ADMIN — GENTAMICIN SULFATE 1000 ML: 40 INJECTION, SOLUTION INTRAMUSCULAR; INTRAVENOUS at 13:43

## 2020-03-05 RX ADMIN — SODIUM CHLORIDE 450 ML: 900 IRRIGANT IRRIGATION at 14:30

## 2020-03-05 RX ADMIN — MIDAZOLAM 2 MG: 1 INJECTION INTRAMUSCULAR; INTRAVENOUS at 10:32

## 2020-03-05 RX ADMIN — PROPOFOL: 10 INJECTION, EMULSION INTRAVENOUS at 11:43

## 2020-03-05 RX ADMIN — FENTANYL CITRATE 50 MCG: 50 INJECTION, SOLUTION INTRAMUSCULAR; INTRAVENOUS at 10:32

## 2020-03-05 RX ADMIN — LIDOCAINE HYDROCHLORIDE 100 MG: 20 INJECTION, SOLUTION INFILTRATION; PERINEURAL at 10:32

## 2020-03-05 RX ADMIN — GABAPENTIN 300 MG: 300 CAPSULE ORAL at 08:33

## 2020-03-05 RX ADMIN — SCOLOPAMINE TRANSDERMAL SYSTEM 1 PATCH: 1 PATCH, EXTENDED RELEASE TRANSDERMAL at 10:35

## 2020-03-05 RX ADMIN — SODIUM CHLORIDE, POTASSIUM CHLORIDE, SODIUM LACTATE AND CALCIUM CHLORIDE: 600; 310; 30; 20 INJECTION, SOLUTION INTRAVENOUS at 12:07

## 2020-03-05 RX ADMIN — PROPOFOL 50 MG: 10 INJECTION, EMULSION INTRAVENOUS at 13:29

## 2020-03-05 ASSESSMENT — LIFESTYLE VARIABLES: TOBACCO_USE: 0

## 2020-03-05 ASSESSMENT — MIFFLIN-ST. JEOR: SCORE: 1322.96

## 2020-03-05 NOTE — ANESTHESIA PREPROCEDURE EVALUATION
Anesthesia Pre-Procedure Evaluation    Patient: Deb Shaw   MRN: [id number] : 1960          Preoperative Diagnosis: Malignant neoplasm of left breast (H) [C50.912]    Procedure(s):  BILATERAL NIPPLE SPARING MASTECTOMY WITH LEFT SENTINEL LYMPH NODE BIOPSY  POSSIBLE LEFT AXILLARY NODE DISSECTION  BILATERAL FIRST STAGE BREAST RECONSTRUCTION WITH TISSUE EXPANDERS ; POSSIBLY ACELLULAR DERMAL MATRIX    Past Medical History:   Diagnosis Date     Abdominal pain, unspecified site      Bursitis, prepatellar, right 2012     HERNIATED DISK CERVICAL-NO MYELOPATHY 2005     iamVILLONOD SYNOVIT-ANKLE 2007     Intermittent asthma 2011     Toxic effect of venom(989.5)      Past Surgical History:   Procedure Laterality Date     HC REPAIR OF HAMMERTOE,ONE       HC TOOTH EXTRACTION W/FORCEP         Anesthesia Evaluation     . Pt has had prior anesthetic.     No history of anesthetic complications          ROS/MED HX    ENT/Pulmonary:     (+)Intermittent asthma Treatment: Inhaler prn,  , . .   (-) tobacco use, sleep apnea and ROSELYN risk factors   Neurologic:       Cardiovascular:  - neg cardiovascular ROS       METS/Exercise Tolerance:  >4 METS   Hematologic:         Musculoskeletal:         GI/Hepatic:  - neg GI/hepatic ROS      (-) GERD   Renal/Genitourinary:  - ROS Renal section negative       Endo:  - neg endo ROS       Psychiatric:         Infectious Disease:         Malignancy:   (+) Malignancy History of Breast  Breast CA Active status post Surgery.         Other:                                 Lab Results   Component Value Date    WBC 8.1 2014    HGB 13.4 2014    HCT 40.9 2014     2014    SED 10 2012     2011    POTASSIUM 4.6 2011    CHLORIDE 100 2011    CO2 28 2011    BUN 10 2011    CR 0.70 2011    GLC 98 2011    ACE 9.4 2011    PHOS 3.7 2006    ALBUMIN 4.5 2011    PROTTOTAL 7.6  "09/01/2011    ALT 29 09/01/2011    AST 31 09/01/2011    ALKPHOS 107 09/01/2011    BILITOTAL 0.7 09/01/2011    TSH 1.12 09/01/2011       Preop Vitals  BP Readings from Last 3 Encounters:   02/05/20 136/88   08/07/14 100/64   10/25/13 105/64    Pulse Readings from Last 3 Encounters:   02/05/20 70   08/07/14 72   10/25/13 89      Resp Readings from Last 3 Encounters:   10/29/09 20   11/11/03 16    SpO2 Readings from Last 3 Encounters:   No data found for SpO2      Temp Readings from Last 1 Encounters:   08/07/14 36.4  C (97.5  F)    Ht Readings from Last 1 Encounters:   02/05/20 1.753 m (5' 9\")      Wt Readings from Last 1 Encounters:   02/05/20 67.1 kg (148 lb)    Estimated body mass index is 21.86 kg/m  as calculated from the following:    Height as of 2/5/20: 1.753 m (5' 9\").    Weight as of 2/5/20: 67.1 kg (148 lb).       Anesthesia Plan      History & Physical Review  History and physical reviewed and following examination; no interval change.    ASA Status:  2 .    NPO Status:  > 8 hours    Plan for General and LMA with Intravenous induction. Maintenance will be Balanced.    PONV prophylaxis:  Ondansetron (or other 5HT-3) and Scopolamine patch  Background propofol gtt      Postoperative Care  Postoperative pain management:  Multi-modal analgesia.      Consents  Anesthetic plan, risks, benefits and alternatives discussed with:  Patient.  Use of blood products discussed: No .   .                 Mabrin Brody MD  "

## 2020-03-05 NOTE — ANESTHESIA POSTPROCEDURE EVALUATION
Patient: Deb Shaw    Procedure(s):  BILATERAL NIPPLE SPARING MASTECTOMY WITH LEFT SENTINEL LYMPH NODE BIOPSY  LEFT AXILLARY NODE DISSECTION  BILATERAL FIRST STAGE BREAST RECONSTRUCTION WITH TISSUE EXPANDERS AND ACELLULAR DERMAL MATRIX    Diagnosis:Malignant neoplasm of left breast (H) [C50.912]  Diagnosis Additional Information: No value filed.    Anesthesia Type:  General, LMA    Note:  Anesthesia Post Evaluation    Patient location during evaluation: PACU  Patient participation: Able to fully participate in evaluation  Level of consciousness: awake and alert  Pain management: adequate  Airway patency: patent  Cardiovascular status: acceptable and hemodynamically stable  Respiratory status: acceptable and unassisted  Hydration status: acceptable  PONV: none             Last vitals:  Vitals:    03/05/20 1600 03/05/20 1615 03/05/20 1630   BP: (!) 142/75 132/74 137/72   Pulse: 88 84 82   Resp: 16 16 15   Temp: 37.4  C (99.3  F) 37.5  C (99.5  F) 37.7  C (99.9  F)   SpO2: 94% 94% 95%         Electronically Signed By: Norm Rick DO  March 5, 2020  5:03 PM

## 2020-03-05 NOTE — BRIEF OP NOTE
General Surgery Brief Operative Note    Pre-operative diagnosis: Malignant neoplasm of left breast (H) [C50.912]   Post-operative diagnosis Same   Procedure: Procedure(s):  BILATERAL NIPPLE SPARING MASTECTOMY WITH LEFT SENTINEL LYMPH NODE BIOPSY  LEFT AXILLARY NODE DISSECTION  BILATERAL FIRST STAGE BREAST RECONSTRUCTION WITH TISSUE EXPANDERS AND ACELLULAR DERMAL MATRIX    Surgeon(s), Assistant(s): Surgeon(s) and Role:  Panel 1:     * Bob Gonzalez MD - Primary     * Valentina Roa PA-C - Assisting  Panel 2:     * Travis Lopez MD - Primary   Estimated blood loss: 40 mL   Drains: None   Specimens: ID Type Source Tests Collected by Time Destination   A : Right breast nipple sparing mastectomy Tissue Breast, Right SURGICAL PATHOLOGY EXAM Bob Gonzalez MD 3/5/2020 11:12 AM    B : Left breast nipple sparing mastectomy Tissue Breast, Left SURGICAL PATHOLOGY EXAM Bob Gonzalez MD 3/5/2020 11:12 AM    C : Left axillary sentinel lymph node Tissue Axilla, Left SURGICAL PATHOLOGY EXAM Bob Gonzalez MD 3/5/2020 12:17 PM    D : Left breast retro areolar tissue Tissue Breast, Left SURGICAL PATHOLOGY EXAM Bob Gonzalez MD 3/5/2020 12:19 PM       Findings: See postop diagnosis   Condition: Stable   Comments:      Valentina Roa PA-C See dictated operative report for full details

## 2020-03-05 NOTE — ANESTHESIA CARE TRANSFER NOTE
Patient: Deb Shaw    Procedure(s):  BILATERAL NIPPLE SPARING MASTECTOMY WITH LEFT SENTINEL LYMPH NODE BIOPSY  LEFT AXILLARY NODE DISSECTION  BILATERAL FIRST STAGE BREAST RECONSTRUCTION WITH TISSUE EXPANDERS AND ACELLULAR DERMAL MATRIX    Diagnosis: Malignant neoplasm of left breast (H) [C50.912]  Diagnosis Additional Information: No value filed.    Anesthesia Type:   General, LMA     Note:  Airway :Face Mask  Patient transferred to:PACU  Handoff Report: Identifed the Patient, Identified the Reponsible Provider, Reviewed the pertinent medical history, Discussed the surgical course, Reviewed Intra-OP anesthesia mangement and issues during anesthesia, Set expectations for post-procedure period and Allowed opportunity for questions and acknowledgement of understanding      Vitals: (Last set prior to Anesthesia Care Transfer)    CRNA VITALS  3/5/2020 1420 - 3/5/2020 1450      3/5/2020             Pulse:  92    SpO2:  99 %    Resp Rate (set):  10                Electronically Signed By: MAR Pa CRNA  March 5, 2020  2:50 PM

## 2020-03-05 NOTE — PROGRESS NOTES
Medication History Completed by Medication Scribe  Admission medication history interview status for the 3/5/2020  admission is complete. See EPIC admission navigator for prior to admission medications     Medication history sources: Patient, Surescripts, H&P and Care Everywhere  Medication history source reliability: Moderate  Adherence assessment: N/A Not Observed    Significant changes made to the medication list:  None      Additional medication history information:   Patient brought own home meds: ARTIFICIAL TEARS OP SOL    Medication reconciliation completed by provider prior to medication history? No    Time spent in this activity: 25 MINUTES      Prior to Admission medications    Medication Sig Last Dose Taking? Auth Provider   acetaminophen (TYLENOL) 325 MG tablet Take 650 mg by mouth daily as needed for mild pain (325MG X 2 = 650MG) 2/28/2020 at PRN Yes Reported, Patient   albuterol (PROAIR HFA, PROVENTIL HFA, VENTOLIN HFA) 108 (90 BASE) MCG/ACT inhaler Inhale 2 puffs into the lungs every 6 hours as needed for shortness of breath / dyspnea ON HAND Yes Kathryn Irizarry MD   amoxicillin (AMOXIL) 500 MG capsule Take 2,000 mg by mouth once as needed (1 HOUR PRIOR TO DENTAL PROCEDURE) (500MG X 4 = 2,000MG) SEPTEMBER 2019 Yes Reported, Patient   cetirizine (ZYRTEC ALLERGY) 10 MG tablet Take 10 mg by mouth daily 3/5/2020 at 0630 Yes Reported, Patient   fluticasone (FLONASE) 50 MCG/ACT nasal spray Spray 1 spray into both nostrils daily 3/5/2020 at 0630 Yes Reported, Patient   hypromellose (ARTIFICIAL TEARS) 0.5 % SOLN ophthalmic solution Place 1 drop into both eyes daily as needed for dry eyes 3/5/2020 at 0630 Yes Reported, Patient   vitamin B complex with vitamin C (VITAMIN  B COMPLEX) tablet Take 1 tablet by mouth every morning  3/4/2020 at AM Yes Reported, Patient   vitamin C (ASCORBIC ACID) 500 MG tablet Take 500 mg by mouth every morning  3/4/2020 at AM Yes Reported, Patient

## 2020-03-05 NOTE — OP NOTE
General Surgery Operative Note    PREOPERATIVE DIAGNOSIS:  Malignant neoplasm of left breast (H) [C50.912]    POSTOPERATIVE DIAGNOSIS: Same    PROCEDURE:   Procedure(s):  BILATERAL NIPPLE SPARING MASTECTOMY WITH LEFT SENTINEL LYMPH NODE BIOPSY    ANESTHESIA:  General.    PREOPERATIVE MEDICATIONS: Ancef    SURGEON:  Bob Gonzalez MD    ASSISTANT:  Valentina Roa PA-C.  Assistant was required owing to challenging exposure and need for retraction.    INDICATIONS: Patient presented with a sizable tumor in the left breast.  Her small breast size made lumpectomy a poor cosmetic choice.  She now presents for bilateral mastectomy with left sentinel node biopsy, possible axillary dissection.      PROCEDURE:  The patient was taken to the operating suite and uneventfully endotracheally intubated.  The upper chest and axillary areas were prepped and draped in a sterile fashion. Plastic surgery had previously marked the breasts for recommended incision sites.  We began on the left.  An inframammary incision was made and this was taken down through skin and subcutaneous tissue. We began raising flaps circumferentially taking this inferiorly and superiorly.  We dissected very closely behind the nipple areolar complex, leaving no significant residual tissue, and constructed the medial flap.  These planes were then taken down circumferentially to the chest wall. I made every attempt to save the medial perforators. We began removing the breast from the chest wall, taking with it the pectoralis fascia. This was taken up toward the left axillary tail and amputated. The left breast was marked for orientation and sent to pathology.  Some additional tissue was taken from behind the areola and this was sent for frozen section.  We requested that pathology inspect this tissue for any signs of malignancy. We then set about performing our sentinel lymph node biopsy.  We accessed the sentinel node through the mastectomy incision.  We used the  gamma probe and encountered an area with significantly elevated radiotracer counts.  We removed a cluster of 2 lymph nodes with counts over 300.  These were sent for frozen section and initial reports came back negative for malignancy.  We took some time to maintain hemostasis and a moist lap was placed in the mastectomy site. We then turned our attention toward the opposite breast.  We again made an inframammary incision and took this down through skin and subcutaneous tissue. We began making circumferential flaps and dissected behind the nipple using cut cautery. This was taken down to the level of the chest wall medially, again trying to spare the perforators. We continued this dissection circumferentially and removed the breast from the chest wall, again taking with it the pectoralis fascia. This was taken up to the level of the axilla and the breast was amputated.  The breast was sent to pathology with a suture at the retroareolar tissue.  We inspected the mastectomy site and ensured hemostasis. We received word from pathology at this point that the sentinal lymph nodes were negative and the retroareolar tissue was also negative. Plastic surgery entered the case at this point to place tissue expanders bilaterally. At the conclusion of our portion of the case, all lap and needle counts were correct.        ESTIMATED BLOOD LOSS: 25 mL    INTRAOPERATIVE FINDINGS: Palpable left breast mass.  Inframammary biopsy and left axillary sentinel nodes negative for malignancy.    Bob Gonzalez MD, MD

## 2020-03-05 NOTE — OP NOTE
Date of Service: 03/05/20    PREOPERATIVE DIAGNOSES:   1.  Left breast cancer  2. Acquired absence of bilateral breasts    POSTOPERATIVE DIAGNOSES:   same    PROCEDURES:     1.  Bilateral first stage breast reconstruction with subpectoral tissue expanders  2.  Acellular dermal matrix placement to bilateral breasts  3.  Laser angiography of mastectomy skin flaps      SURGEON: Travis Lopez MD     ANESTHESIA: General endotracheal anesthesia    COMPLICATIONS: None.     ESTIMATED BLOOD LOSS: 20 ml    DISPOSITION: To the Post Anesthesia Care Unit in stable condition.    TUBES AND DRAINS: Cisco x2    COUNTS: Correct     SPECIMENS: None    IMPLANTS:     1. On the right : Allergan [id number] serial #[id number] filled to 225 cc.  2. On the left :  Allergan [id number] serial # [id number] filled to 225 cc.    INDICATIONS:    This is a 59yo female who has breast cancer and has elected for bilateral nipple sparing mastectomies.  She has elected for an implant-based reconstruction using tissue expanders and possibly acellular dermal matrix at the first stage.  She has requested subpectoral placement.  We discussed details, risks, benefits, and alternatives of the procedure. She understands limitations and risks including bleeding, hematoma, or seroma that could require surgical evacuation. We discussed tissue expander complications including rupture or deflation, infection or extrusion, rippling or wrinkling, capsular contracture, or any known, suspected, or yet to be determined links between systemic disease and implants. She understands there could be a need for unplanned surgical revision. We discussed there is no guarantee of cure or protection from benign or malignant breast or skin disease. She voiced understanding, signed consent, elected to proceed.    DESCRIPTION OF PROCEDURE:    She was marked preoperatively in the upright position. She was taken to the operating room.  I came in the room at the completion of the  mastectomies.  A timeout was performed to ensure the correct orientation and procedure.    I began on the left side.  The pectoralis major muscle was elevated from the superior lateral aspect.  Origins inferiorly were divided.  I inspected the inframammary fold.  The fold was found to be intact..  Because there was insufficient tissue coverage for the device, I brought a piece of ready to use, contour, perforated Dermacell onto the field.  It was rinsed in triple antibiotic solution and Betadine and it was sewn along the inframammary fold and the lateral mammary fold with interrupted 2-0 Vicryl.    A tissue expander of appropriate base width was brought onto the field.  The air was evacuated and it was filled with 225 cc of sterile injectable saline.  It was placed into the submuscular position.  It was placed against the medial aspect of the pocket and the inframammary fold.  Suture tabs were secured with 2-0 silk sutures.  The upper part of the Dermacell was sewn to the lower border of the pectoralis major muscle with interrupted 2-0 Vicryl sutures.    Hemostasis was achieved with electrocautery.  I irrigated with triple antibiotic solution and placed Surgicell into the superior recesses of the pocket and towards the axilla.  A 15-Tamazight round channel drain was placed laterally and secured with a 2-0 silk.    I removed approximately 2 mm of the skin edge from the upper mastectomy flap to optimize healing of the incision.  The skin was closed with deep dermal 3-0 Monocryl and running 4-0 Monocryl subcuticular stitching without tension.    I turned my attention to the right side.  The pectoralis major muscle was elevated from the superior lateral aspect.  Origins inferiorly were divided.  I inspected the inframammary fold.  The fold was found to be intact..  Because there was insufficient tissue coverage for the device, I brought a piece of ready to use, contour, perforated Dermacell onto the field.  It was rinsed  in triple antibiotic solution and Betadine and it was sewn along the inframammary fold and the lateral mammary fold with interrupted 2-0 Vicryl.    A tissue expander of appropriate base width was brought onto the field.  The air was evacuated and it was filled with 225 cc of sterile injectable saline.  It was placed into the submuscular position.  It was placed against the medial aspect of the pocket and the inframammary fold.  Suture tabs were secured with 2-0 silk sutures.  The upper part of the Dermacell was sewn to the lower border of the pectoralis major muscle with interrupted 2-0 Vicryl sutures.    Hemostasis was achieved with electrocautery.  I irrigated with triple antibiotic solution and placed Surgicell into the superior recesses of the pocket and towards the axilla.  A 15-Chinese round channel drain was placed laterally and secured with a 2-0 silk.    I removed approximately 2 mm of the skin edge from the upper mastectomy flap to optimize healing of the incision. The skin was closed with deep dermal 3-0 Monocryl and running 4-0 Monocryl subcuticular stitching without tension.    Laser angiography was then performed.  The anesthesia staff injected 3 cc of the indocyanine green followed by a 10 cc bolus of normal saline.  Using the Spy Elite system, the skin flaps were evaluated.   Skin flap perfusion indices appeared viable on both sides.    Mastisol and Steri-Strips were applied to the incisions. Large tegaderms were placed over the breasts to immobilize the skin flaps and the nipples were exteriorized.  Sterile Kerlix gauze and sterile drain sponges were placed over the breasts. She was wrapped comfortably with a double 6-inch Ace bandage, awoken from anesthesia, and taken to the PACU in stable condition.

## 2020-03-05 NOTE — ANESTHESIA CARE TRANSFER NOTE
Patient: Deb Shaw    Procedure(s):  BILATERAL NIPPLE SPARING MASTECTOMY WITH LEFT SENTINEL LYMPH NODE BIOPSY  LEFT AXILLARY NODE DISSECTION  BILATERAL FIRST STAGE BREAST RECONSTRUCTION WITH TISSUE EXPANDERS AND ACELLULAR DERMAL MATRIX    Diagnosis: Malignant neoplasm of left breast (H) [C50.912]  Diagnosis Additional Information: No value filed.    Anesthesia Type:   General, LMA     Note:  Airway :Face Mask  Patient transferred to:PACU  Handoff Report: Identifed the Patient, Identified the Reponsible Provider, Reviewed the pertinent medical history, Discussed the surgical course, Reviewed Intra-OP anesthesia mangement and issues during anesthesia, Set expectations for post-procedure period and Allowed opportunity for questions and acknowledgement of understanding      Vitals: (Last set prior to Anesthesia Care Transfer)    CRNA VITALS  3/5/2020 1420 - 3/5/2020 1455      3/5/2020             Pulse:  92    SpO2:  99 %    Resp Rate (set):  10                Electronically Signed By: MAR Lay CRNA  March 5, 2020  2:55 PM

## 2020-03-05 NOTE — BRIEF OP NOTE
General Surgery Brief Operative Note    Pre-operative diagnosis: Malignant neoplasm of left breast (H) [C50.912]   Post-operative diagnosis Same   Procedure: Procedure(s):  BILATERAL NIPPLE SPARING MASTECTOMY WITH LEFT SENTINEL LYMPH NODE BIOPSY  BILATERAL FIRST STAGE BREAST RECONSTRUCTION WITH TISSUE EXPANDERS ; POSSIBLY ACELLULAR DERMAL MATRIX    Surgeon(s), Assistant(s): Surgeon(s) and Role:  Panel 1:     * Bob Gonzalez MD - Primary     * Valentina Roa PA-C - Assisting  Panel 2:     * Travis Lopez MD - Primary   Estimated blood loss: * No values recorded between 3/5/2020 10:55 AM and 3/5/2020  1:34 PM *   Drains: Clarence-Morales x2   Specimens: ID Type Source Tests Collected by Time Destination   A : Right breast nipple sparing mastectomy Tissue Breast, Right SURGICAL PATHOLOGY EXAM Bob Gonzalez MD 3/5/2020 11:12 AM    B : Left breast nipple sparing mastectomy Tissue Breast, Left SURGICAL PATHOLOGY EXAM Bob Gonzlaez MD 3/5/2020 11:12 AM    C : Left axillary sentinel lymph node Tissue Axilla, Left SURGICAL PATHOLOGY EXAM Bob Gonzalez MD 3/5/2020 12:17 PM    D : Left breast retro areolar tissue Tissue Breast, Left SURGICAL PATHOLOGY EXAM Bob Gonzalez MD 3/5/2020 12:19 PM       Findings: See postop diagnosis   Condition: Stable   Comments:      Valentina Roa PA-C See dictated operative report for full details

## 2020-03-06 VITALS
RESPIRATION RATE: 16 BRPM | OXYGEN SATURATION: 96 % | HEIGHT: 68 IN | TEMPERATURE: 98.4 F | WEIGHT: 155.87 LBS | HEART RATE: 80 BPM | DIASTOLIC BLOOD PRESSURE: 72 MMHG | SYSTOLIC BLOOD PRESSURE: 131 MMHG | BODY MASS INDEX: 23.62 KG/M2

## 2020-03-06 PROCEDURE — 25000128 H RX IP 250 OP 636: Performed by: PLASTIC SURGERY

## 2020-03-06 PROCEDURE — 25000132 ZZH RX MED GY IP 250 OP 250 PS 637: Performed by: PLASTIC SURGERY

## 2020-03-06 RX ORDER — METHOCARBAMOL 750 MG/1
750 TABLET, FILM COATED ORAL 4 TIMES DAILY PRN
Qty: 50 TABLET | Refills: 1 | Status: SHIPPED | OUTPATIENT
Start: 2020-03-06 | End: 2020-05-01

## 2020-03-06 RX ORDER — CEPHALEXIN 500 MG/1
500 CAPSULE ORAL EVERY 6 HOURS
Qty: 40 CAPSULE | Refills: 1 | Status: SHIPPED | OUTPATIENT
Start: 2020-03-06 | End: 2020-05-01

## 2020-03-06 RX ORDER — HYDROCODONE BITARTRATE AND ACETAMINOPHEN 5; 325 MG/1; MG/1
1-2 TABLET ORAL EVERY 4 HOURS PRN
Qty: 40 TABLET | Refills: 0 | Status: SHIPPED | OUTPATIENT
Start: 2020-03-06 | End: 2020-05-01

## 2020-03-06 RX ORDER — AMOXICILLIN 250 MG
1 CAPSULE ORAL 2 TIMES DAILY
Qty: 30 TABLET | Refills: 0 | Status: SHIPPED | OUTPATIENT
Start: 2020-03-06 | End: 2020-05-01

## 2020-03-06 RX ADMIN — SENNOSIDES AND DOCUSATE SODIUM 2 TABLET: 8.6; 5 TABLET ORAL at 08:11

## 2020-03-06 RX ADMIN — CEFAZOLIN 1 G: 1 INJECTION, POWDER, FOR SOLUTION INTRAMUSCULAR; INTRAVENOUS at 03:59

## 2020-03-06 RX ADMIN — HYDROCODONE BITARTRATE AND ACETAMINOPHEN 2 TABLET: 5; 325 TABLET ORAL at 01:30

## 2020-03-06 RX ADMIN — CETIRIZINE HYDROCHLORIDE 10 MG: 10 TABLET, FILM COATED ORAL at 08:11

## 2020-03-06 RX ADMIN — GABAPENTIN 300 MG: 300 CAPSULE ORAL at 08:11

## 2020-03-06 RX ADMIN — HYDROCODONE BITARTRATE AND ACETAMINOPHEN 2 TABLET: 5; 325 TABLET ORAL at 06:41

## 2020-03-06 ASSESSMENT — MIFFLIN-ST. JEOR: SCORE: 1335.5

## 2020-03-06 NOTE — PROGRESS NOTES
"[facility]  General Surgery Progress Note    Admission Date: 3/5/2020  3/6/2020         Assessment and Plan:     Deb Shaw is a 60 year old female S/P Procedure(s):  BILATERAL NIPPLE SPARING MASTECTOMY WITH LEFT SENTINEL LYMPH NODE BIOPSY, BILATERAL FIRST STAGE BREAST RECONSTRUCTION WITH TISSUE EXPANDERS AND ACELLULAR DERMAL MATRIX 1 Day Post-Op    - Pain controlled  - Ambulate 4x day and encourage IS  - Home today, added Senna to discharge meds  - RHONDA drain teaching  - Follow up with Dr. Gonzalez in 2 weeks             Interval History:     Pain controlled, tolerating diet, urinating without difficulty, ambulating. Meds reviewed.                    Physical Exam:   Blood pressure 131/72, pulse 80, temperature 98.4  F (36.9  C), temperature source Oral, resp. rate 16, height 1.727 m (5' 8\"), weight 70.7 kg (155 lb 13.8 oz), SpO2 96 %, not currently breastfeeding.  Temperature Temp  Av.3  F (37.4  C)  Min: 98.4  F (36.9  C)  Max: 100  F (37.8  C)   I/O last 3 completed shifts:  In: 3921.67 [P.O.:1000; I.V.:2921.67]  Out: 1850 [Urine:1550; Drains:260; Blood:40]  Constitutional:  Awake, alert, oriented, and in no apparent distress.   Lungs: No increased work of breathing, good air exchange, clear to auscultation bilaterally, and no crackles or wheezing.   Cardiovascular: Regular rate and rhythm, normal S1 and S2, and no murmur noted.   Chest Wound(s): Appropriately tender. Clean, dry, and intact. No erythema or drainage. No palpable seroma/hematoma. RHONDA drain intact without leakage, serosanguinous.     Extremities: Non-tender and without significant swelling to upper extremities. No edema or calf tenderness.          Data:   No new labs/imaging.      Valentina Roa PA-C  [facility]  [phone]  "

## 2020-03-06 NOTE — PLAN OF CARE
A/O x4. VSS on RA. Up with SBA. Tolerating clear liquid diet. Lung sounds clear. Bowel sounds active. No flatus yet. No BM. Adequate urine output. MARIA ISABEL incisions under ace wraps. JPx2 with dark red/brownish output. Pain managed with prn norco x2. Denies nausea. CTM.

## 2020-03-06 NOTE — PROGRESS NOTES
"Plastic Surgery POD #1    Pain controlled  /79 (BP Location: Right arm)   Pulse 80   Temp 99.1  F (37.3  C) (Oral)   Resp 16   Ht 1.727 m (5' 8\")   Wt 70.7 kg (155 lb 13.8 oz)   SpO2 95%   BMI 23.70 kg/m    JPs serosanguinous, volumes ok  No hematoma  Skin flaps look good    Saline lock and change to bra  Patient fully instructed.  Home today.  "

## 2020-03-06 NOTE — PLAN OF CARE
A&O, VSS. Lung sounds clear. Bowel sounds hypoactive, -flatus. Adequate urine output. Bilateral chest incisions CDI. Ace bandage in place. Bilateral RHONDA's to bulb suction.  Ambulates standby assist.Tolerating regular diet. Pain controlled by PRN Norco. Denies nausea. Discharge in process for today.

## 2020-03-06 NOTE — DISCHARGE SUMMARY
"Discharge Summary    Deb Shaw MRN# [id number]   YOB: 1960 Age: 60 year old     Date of Admission:  3/5/2020  Date of Discharge:  3/6/2020  Admitting Physician:  Travis Lopez MD  Discharge Physician:  Travis Lopez MD  Discharging Service:  Plastic and Reconstructive Surgery     Primary Provider: Fab Owens          Admission Diagnoses:   Malignant neoplasm of left breast (H) [C50.912]          Discharge Diagnosis:   same             Discharge Disposition:   Discharged to home           Condition on Dischasrge:   Discharge condition: Stable   Discharge vitals: Blood pressure 139/79, pulse 80, temperature 99.1  F (37.3  C), temperature source Oral, resp. rate 16, height 1.727 m (5' 8\"), weight 70.7 kg (155 lb 13.8 oz), SpO2 95 %, not currently breastfeeding.     Code status on discharge: Full Code           Procedures / Labs / Imaging:   Bilateral nipple sparing mastectomies and first stage breast reconstruction with tissue expnaders          Medications Prior to Admission:     Medications Prior to Admission   Medication Sig Dispense Refill Last Dose     albuterol (PROAIR HFA, PROVENTIL HFA, VENTOLIN HFA) 108 (90 BASE) MCG/ACT inhaler Inhale 2 puffs into the lungs every 6 hours as needed for shortness of breath / dyspnea 1 Inhaler 0 ON HAND     amoxicillin (AMOXIL) 500 MG capsule Take 2,000 mg by mouth once as needed (1 HOUR PRIOR TO DENTAL PROCEDURE) (500MG X 4 = 2,000MG)   SEPTEMBER 2019     cetirizine (ZYRTEC ALLERGY) 10 MG tablet Take 10 mg by mouth daily   3/5/2020 at 0630     fluticasone (FLONASE) 50 MCG/ACT nasal spray Spray 1 spray into both nostrils daily   3/5/2020 at 0630     hypromellose (ARTIFICIAL TEARS) 0.5 % SOLN ophthalmic solution Place 1 drop into both eyes daily as needed for dry eyes   3/5/2020 at 0630     vitamin B complex with vitamin C (VITAMIN  B COMPLEX) tablet Take 1 tablet by mouth every morning    3/4/2020 at AM     vitamin C (ASCORBIC ACID) 500 " MG tablet Take 500 mg by mouth every morning    3/4/2020 at AM     [DISCONTINUED] acetaminophen (TYLENOL) 325 MG tablet Take 650 mg by mouth daily as needed for mild pain (325MG X 2 = 650MG)   2/28/2020 at PRN             Discharge Medications:     Current Discharge Medication List      START taking these medications    Details   cephALEXin (KEFLEX) 500 MG capsule Take 1 capsule (500 mg) by mouth every 6 hours Take as long as drains are in place.  Qty: 40 capsule, Refills: 1    Associated Diagnoses: Invasive ductal carcinoma of breast, female, left (H)      HYDROcodone-acetaminophen (NORCO) 5-325 MG tablet Take 1-2 tablets by mouth every 4 hours as needed  Qty: 40 tablet, Refills: 0    Associated Diagnoses: Invasive ductal carcinoma of breast, female, left (H)      methocarbamol (ROBAXIN) 750 MG tablet Take 1 tablet (750 mg) by mouth 4 times daily as needed for muscle spasms  Qty: 50 tablet, Refills: 1    Associated Diagnoses: Invasive ductal carcinoma of breast, female, left (H)         CONTINUE these medications which have NOT CHANGED    Details   albuterol (PROAIR HFA, PROVENTIL HFA, VENTOLIN HFA) 108 (90 BASE) MCG/ACT inhaler Inhale 2 puffs into the lungs every 6 hours as needed for shortness of breath / dyspnea  Qty: 1 Inhaler, Refills: 0    Comments: Due for physical. Please advise her to schedule.  Associated Diagnoses: Intermittent asthma      amoxicillin (AMOXIL) 500 MG capsule Take 2,000 mg by mouth once as needed (1 HOUR PRIOR TO DENTAL PROCEDURE) (500MG X 4 = 2,000MG)      cetirizine (ZYRTEC ALLERGY) 10 MG tablet Take 10 mg by mouth daily      fluticasone (FLONASE) 50 MCG/ACT nasal spray Spray 1 spray into both nostrils daily      hypromellose (ARTIFICIAL TEARS) 0.5 % SOLN ophthalmic solution Place 1 drop into both eyes daily as needed for dry eyes      vitamin B complex with vitamin C (VITAMIN  B COMPLEX) tablet Take 1 tablet by mouth every morning       vitamin C (ASCORBIC ACID) 500 MG tablet Take 500  mg by mouth every morning          STOP taking these medications       acetaminophen (TYLENOL) 325 MG tablet Comments:   Reason for Stopping:                     Consultations:   No consultations were requested during this admission                Hospital Course:   The patient's postop course was uneventful.               Significant Results:   None             Pending Results:   None           Discharge Instructions and Follow-Up:   See discharge orders.

## 2020-03-06 NOTE — PLAN OF CARE
Pt to floor, lethargic, oriented to room and call lights, ace wrap in place on chest, clean dry and intact, two RHONDA drains in place, lungs clear, O2 sats in 90's on 2L NC, hypo bowel sounds, due to void

## 2020-03-06 NOTE — PLAN OF CARE
Got report from previous RN @ 1100 that patient is ready for discharge and is waiting for discharge meds and RHONDA drain teaching when daughter arrives. Reviewed AVS and RHONDA drain cares with both and patient and daughter.    Discharge    Patient discharged to home via wheelchair with volunteer to door 2    Listed belongings gathered and returned to patient. Yes  Care Plan and Patient education resolved: Yes  Prescriptions if needed, hard copies sent with patient  Yes  Home and hospital acquired medications returned to patient: NA  Medication Bin checked and emptied on discharge Yes  Follow up appointment made for patient: patient to make and verbalized understanding.

## 2020-03-09 ENCOUNTER — DOCUMENTATION ONLY (OUTPATIENT)
Dept: SURGERY | Facility: CLINIC | Age: 60
End: 2020-03-09

## 2020-03-09 ENCOUNTER — TELEPHONE (OUTPATIENT)
Dept: SURGERY | Facility: CLINIC | Age: 60
End: 2020-03-09

## 2020-03-09 LAB — COPATH REPORT: NORMAL

## 2020-03-09 NOTE — TELEPHONE ENCOUNTER
"I spoke with patient this morning who states she has had some issues with her breathing over the past 2 days.    Patient had a bilateral mastectomy, left slnbx with reconstruction with Dr. Gonzalez and Dr. Lopez on 3/5/20, and overall states she is feeling well from a surgery standpoint.  Patient states her incisions are clean, pink, dry and RHONDA drains are intact and putting out about 85-95 cc's per day.  Patient states the left axilla is a little swollen, but denies redness, fevers or discomfort.  Informed patient to monitor the swelling and call us back if it increases.     Patient reports having a \"rattling & tightness in her chest\" over this past weekend, in which she used her albuterol inhaler and now feels her breathing and chest tightness has improved.  Patient states she has an occasional productive cough with a clear to light yellow sputum.  Patient denies fevers.  Patient reports having history of intermittent asthma flare ups that primarily occur with onset of a cold or URI.  She has a albuterol inhaler, but feels she is almost out and is needing a refill.      Informed patient [facility] to inform of her respiratory symptoms and check if she should be seen, and get a refill on her albuterol inhaler.  Informed patient to call us back with any concerns or questions.  I notified Dr. Gonzalez of patients call. Patient is scheduled to see Dr. Lopez on 3/13/20, and Dr. Gonzalez on 3/18/20.    Patient verbalized understanding and agrees with the plan of care.  Katia Agee, RN, BSN    "

## 2020-03-09 NOTE — PROGRESS NOTES
Oncotype Dx Breast Testing ordered today per Dr. Bob Gonzalez request on patient's bilateral mastectomy, left slnbx surgery performed 3/5/20, Case #[id number].      Results pending.  Patient and Dr. Gonzalez will be informed once results are available in approximately 14 days.  Katia Agee, RN, BSN

## 2020-03-09 NOTE — TELEPHONE ENCOUNTER
.Patient had BILATERAL NIPPLE SPARING MASTECTOMY WITH LEFT SENTINEL LYMPH NODE BIOPSY 3/5/20 [facility]  Having questions about swelling and pain, also having some asthma symptoms    Please call    Phone: [phone]

## 2020-03-11 ENCOUNTER — TELEPHONE (OUTPATIENT)
Dept: SURGERY | Facility: CLINIC | Age: 60
End: 2020-03-11

## 2020-03-12 ENCOUNTER — TELEPHONE (OUTPATIENT)
Dept: SURGERY | Facility: CLINIC | Age: 60
End: 2020-03-12

## 2020-03-12 NOTE — TELEPHONE ENCOUNTER
Deb called today to report output in right riky drain increased overnight. She reports 85mL of serosanguineous fluid in right riky bulb in the past 24 hours. Left riky drain remains in place with moderate amount of output. Will cancel appointment for today. She will follow-up with Dr. Lopez as scheduled next Tuesday and Dr. Gonzalez on Wednesday. She knows to contact us in the interim with additional questions/concerns.    Tawanna DAVISN, RN, OCN  Oncology Care Coordinator  [facility]  [facility]  Phone: [phone]

## 2020-03-18 ENCOUNTER — TELEPHONE (OUTPATIENT)
Dept: SURGERY | Facility: CLINIC | Age: 60
End: 2020-03-18

## 2020-03-18 ENCOUNTER — OFFICE VISIT (OUTPATIENT)
Dept: SURGERY | Facility: CLINIC | Age: 60
End: 2020-03-18
Payer: COMMERCIAL

## 2020-03-18 VITALS — HEIGHT: 68 IN | BODY MASS INDEX: 23.49 KG/M2 | WEIGHT: 155 LBS

## 2020-03-18 DIAGNOSIS — Z09 SURGERY FOLLOW-UP EXAMINATION: Primary | ICD-10-CM

## 2020-03-18 PROCEDURE — 99024 POSTOP FOLLOW-UP VISIT: CPT | Performed by: SURGERY

## 2020-03-18 ASSESSMENT — MIFFLIN-ST. JEOR: SCORE: 1331.58

## 2020-03-18 NOTE — TELEPHONE ENCOUNTER
Deb is scheduled to meet with medical oncologist, Dr. Arizmendi, on April 6th, check in at 12:30 pm. Deb verbalized understanding.    Tawanna Hutchinson BSN, RN, OCN  Oncology Care Coordinator  [facility]  [facility]  Phone: [phone]

## 2020-03-19 ENCOUNTER — TELEPHONE (OUTPATIENT)
Dept: SURGERY | Facility: CLINIC | Age: 60
End: 2020-03-19

## 2020-03-19 NOTE — TELEPHONE ENCOUNTER
Deb notified, Oncotype DX Breast Recurrence Score of 22. Absolute chemotherapy benefit < 1%. Discuss results and further treatment recommendations in greater detail with medical oncologist, Dr. Arizmendi. Both parties in agreement of plan.    Tawanna DAVISN, RN, OCN  Oncology Care Coordinator  [facility]  [phone]

## 2020-03-20 NOTE — PROGRESS NOTES
Surgery Postop Note    Deb Shaw presents today for surgical followup.  she is doing well following bilateral nipple sparing mastectomy with left sentinel lymph node biopsy.  Incisions look fine with no signs of wound infection.  She is still having a considerable amount of serous drainage, and I have elected to leave the drains in place for now.  Her pathology looked favorable, with negative margins and negative lymph nodes.  Plan is to send her tissue for Oncotype with medical oncology follow-up in the near future.  I expect her to make a complete recovery.  Thank you for the opportunity to help in her care.    Maximo Gonzalez M.D.  [facility]  [phone]    Please route or send letter to:  Primary Care Provider (PCP) and Referring Provider

## 2020-04-06 ENCOUNTER — TRANSFERRED RECORDS (OUTPATIENT)
Dept: HEALTH INFORMATION MANAGEMENT | Facility: CLINIC | Age: 60
End: 2020-04-06

## 2020-05-01 ENCOUNTER — OFFICE VISIT (OUTPATIENT)
Dept: SURGERY | Facility: CLINIC | Age: 60
End: 2020-05-01
Payer: COMMERCIAL

## 2020-05-01 DIAGNOSIS — Z09 SURGERY FOLLOW-UP EXAMINATION: Primary | ICD-10-CM

## 2020-05-01 PROCEDURE — 99024 POSTOP FOLLOW-UP VISIT: CPT | Performed by: SURGERY

## 2020-05-01 NOTE — PROGRESS NOTES
Surgery Postop Note    Deb Shaw presents today for surgical followup.  she is doing well following bilateral mastectomy .  Incisions look fine with no signs of wound infection.  She is being treated with an AI.  I expect her to make a complete recovery.  I would like to see her again in 1 year. Thank you for the opportunity to help in her care.    Maximo Gonzalez M.D.  [facility]  [phone]    Please route or send letter to:  Primary Care Provider (PCP) and Referring Provider

## 2020-05-06 ENCOUNTER — TRANSFERRED RECORDS (OUTPATIENT)
Dept: HEALTH INFORMATION MANAGEMENT | Facility: CLINIC | Age: 60
End: 2020-05-06

## 2020-08-10 ENCOUNTER — TRANSFERRED RECORDS (OUTPATIENT)
Dept: HEALTH INFORMATION MANAGEMENT | Facility: CLINIC | Age: 60
End: 2020-08-10

## 2020-11-02 ENCOUNTER — TRANSFERRED RECORDS (OUTPATIENT)
Dept: HEALTH INFORMATION MANAGEMENT | Facility: CLINIC | Age: 60
End: 2020-11-02

## 2021-01-15 ENCOUNTER — HEALTH MAINTENANCE LETTER (OUTPATIENT)
Age: 61
End: 2021-01-15

## 2021-04-29 ENCOUNTER — TRANSFERRED RECORDS (OUTPATIENT)
Dept: HEALTH INFORMATION MANAGEMENT | Facility: CLINIC | Age: 61
End: 2021-04-29

## 2021-09-04 ENCOUNTER — HEALTH MAINTENANCE LETTER (OUTPATIENT)
Age: 61
End: 2021-09-04

## 2021-10-29 ENCOUNTER — TRANSFERRED RECORDS (OUTPATIENT)
Dept: HEALTH INFORMATION MANAGEMENT | Facility: CLINIC | Age: 61
End: 2021-10-29
Payer: COMMERCIAL

## 2022-02-19 ENCOUNTER — HEALTH MAINTENANCE LETTER (OUTPATIENT)
Age: 62
End: 2022-02-19

## 2022-06-15 ENCOUNTER — TRANSFERRED RECORDS (OUTPATIENT)
Dept: FAMILY MEDICINE | Facility: CLINIC | Age: 62
End: 2022-06-15

## 2022-10-22 ENCOUNTER — HEALTH MAINTENANCE LETTER (OUTPATIENT)
Age: 62
End: 2022-10-22

## 2023-04-01 ENCOUNTER — HEALTH MAINTENANCE LETTER (OUTPATIENT)
Age: 63
End: 2023-04-01

## (undated) DEVICE — PREP SKIN SCRUB TRAY 4461A

## (undated) DEVICE — SU SILK 2-0 SH CR 8X18" C012D

## (undated) DEVICE — BNDG ELASTIC 6" DBL LENGTH UNSTERILE 6611-16

## (undated) DEVICE — TUBING INFUSION INFILTRATION LIPOSUCTION 156" 24-6008

## (undated) DEVICE — SU VICRYL 2-0 SH 27" J317H

## (undated) DEVICE — SOL NACL 0.9% INJ 1000ML BAG 2B1324X

## (undated) DEVICE — SU MONOCRYL 4-0 PS-2 18" UND Y496G

## (undated) DEVICE — SU VICRYL 2-0 TIE 12X18" J905T

## (undated) DEVICE — SUCTION CANISTER MEDIVAC LINER 3000ML W/LID 65651-530

## (undated) DEVICE — SYR 10ML FINGER CONTROL W/O NDL 309695

## (undated) DEVICE — PACK MAJOR SBA15MAFSI

## (undated) DEVICE — PAD CHUX UNDERPAD 23X24" 7136

## (undated) DEVICE — KIT SPY ELITE DISP KIT W/DRAPE SGL PACK LC3001

## (undated) DEVICE — CATH TRAY FOLEY 16FR BARDEX W/DRAIN BAG STATLOCK 300316A

## (undated) DEVICE — BLADE KNIFE SURG 10 371110

## (undated) DEVICE — DECANTER VIAL 2006S

## (undated) DEVICE — SOL WATER IRRIG 1000ML BOTTLE 2F7114

## (undated) DEVICE — SU VICRYL 3-0 FS-1 27" J442H

## (undated) DEVICE — DRSG XEROFORM 1X8"

## (undated) DEVICE — COVER ULTRASOUND PROBE 6X48" PC1290

## (undated) DEVICE — DRSG KERLIX 4 1/2"X4YDS ROLL 6715

## (undated) DEVICE — DRSG DRAIN 4X4" 7086

## (undated) DEVICE — GLOVE PROTEXIS W/NEU-THERA 7.5  2D73TE75

## (undated) DEVICE — SU MONOCRYL 3-0 PS-2 27" Y427H

## (undated) DEVICE — SU SILK 2-0 FSL 18" 677G

## (undated) DEVICE — DRSG STERI STRIP 1/2X4" R1547

## (undated) DEVICE — ESU PENCIL W/SMOKE EVAC NEPTUNE STRYKER 0703-046-000

## (undated) DEVICE — DRAPE BREAST/CHEST 29420

## (undated) DEVICE — ADH LIQUID MASTISOL TOPICAL VIAL 2-3ML 0523-48

## (undated) DEVICE — ESU ELEC BLADE 2.75" COATED/INSULATED E1455

## (undated) DEVICE — NDL SPINAL 22GA 3.5" QUINCKE 405181

## (undated) DEVICE — LINEN TOWEL PACK X5 5464

## (undated) DEVICE — DRAIN JACKSON PRATT RESERVOIR 100ML SU130-1305

## (undated) DEVICE — DRSG TEGADERM 8X12" 1629

## (undated) DEVICE — SURGICEL POWDER ABSORBABLE HEMOSTAT 3GM 3013SP

## (undated) DEVICE — GLOVE PROTEXIS BLUE W/NEU-THERA 7.5  2D73EB75

## (undated) DEVICE — DRAIN JACKSON PRATT 15FR ROUND SIL LF JP-2229

## (undated) RX ORDER — FENTANYL CITRATE 50 UG/ML
INJECTION, SOLUTION INTRAMUSCULAR; INTRAVENOUS
Status: DISPENSED
Start: 2020-03-05

## (undated) RX ORDER — LIDOCAINE HYDROCHLORIDE 20 MG/ML
INJECTION, SOLUTION EPIDURAL; INFILTRATION; INTRACAUDAL; PERINEURAL
Status: DISPENSED
Start: 2020-03-05

## (undated) RX ORDER — KETAMINE HCL IN NACL, ISO-OSM 100MG/10ML
SYRINGE (ML) INJECTION
Status: DISPENSED
Start: 2020-03-05

## (undated) RX ORDER — HYDROMORPHONE HYDROCHLORIDE 1 MG/ML
INJECTION, SOLUTION INTRAMUSCULAR; INTRAVENOUS; SUBCUTANEOUS
Status: DISPENSED
Start: 2020-03-05

## (undated) RX ORDER — OXYCODONE HCL 10 MG/1
TABLET, FILM COATED, EXTENDED RELEASE ORAL
Status: DISPENSED
Start: 2020-03-05

## (undated) RX ORDER — CEFAZOLIN SODIUM 2 G/100ML
INJECTION, SOLUTION INTRAVENOUS
Status: DISPENSED
Start: 2020-03-05

## (undated) RX ORDER — PROPOFOL 10 MG/ML
INJECTION, EMULSION INTRAVENOUS
Status: DISPENSED
Start: 2020-03-05

## (undated) RX ORDER — SCOLOPAMINE TRANSDERMAL SYSTEM 1 MG/1
PATCH, EXTENDED RELEASE TRANSDERMAL
Status: DISPENSED
Start: 2020-03-05

## (undated) RX ORDER — DEXAMETHASONE SODIUM PHOSPHATE 4 MG/ML
INJECTION, SOLUTION INTRA-ARTICULAR; INTRALESIONAL; INTRAMUSCULAR; INTRAVENOUS; SOFT TISSUE
Status: DISPENSED
Start: 2020-03-05

## (undated) RX ORDER — CEFAZOLIN SODIUM 1 G/3ML
INJECTION, POWDER, FOR SOLUTION INTRAMUSCULAR; INTRAVENOUS
Status: DISPENSED
Start: 2020-03-05

## (undated) RX ORDER — ONDANSETRON 2 MG/ML
INJECTION INTRAMUSCULAR; INTRAVENOUS
Status: DISPENSED
Start: 2020-03-05

## (undated) RX ORDER — GABAPENTIN 300 MG/1
CAPSULE ORAL
Status: DISPENSED
Start: 2020-03-05